# Patient Record
Sex: MALE | Race: BLACK OR AFRICAN AMERICAN | NOT HISPANIC OR LATINO | URBAN - METROPOLITAN AREA
[De-identification: names, ages, dates, MRNs, and addresses within clinical notes are randomized per-mention and may not be internally consistent; named-entity substitution may affect disease eponyms.]

---

## 2023-03-20 ENCOUNTER — OUT OF OFFICE VISIT (OUTPATIENT)
Dept: URBAN - METROPOLITAN AREA MEDICAL CENTER 16 | Facility: MEDICAL CENTER | Age: 78
End: 2023-03-20
Payer: COMMERCIAL

## 2023-03-20 DIAGNOSIS — Z12.11 AVERAGE RISK FOR CRC. DUE TO PT'S CO-MORBID STATE WITH END STAGE DEMENTIA, HIGH RISK FOR ANESTHESIA (PER NEUROLOGY); INABILITY TO TAKE A BOWEL PREP....WOULD NOT ADVISE ANY COLORECTAL CANCER SCREENING INCLUDING STOOL TEST FOR FECAL BLOOD.: ICD-10-CM

## 2023-03-20 DIAGNOSIS — D50.8 ACQUIRED IRON DEFICIENCY ANEMIA DUE TO DECREASED ABSORPTION: ICD-10-CM

## 2023-03-20 PROCEDURE — 996 AG2 NON-BILLABLE: Performed by: INTERNAL MEDICINE

## 2023-03-20 PROCEDURE — 99222 1ST HOSP IP/OBS MODERATE 55: CPT | Performed by: INTERNAL MEDICINE

## 2023-03-20 PROCEDURE — 99232 SBSQ HOSP IP/OBS MODERATE 35: CPT

## 2023-03-20 PROCEDURE — G8427 DOCREV CUR MEDS BY ELIG CLIN: HCPCS | Performed by: INTERNAL MEDICINE

## 2023-03-20 PROCEDURE — 996 AG2 NON-BILLABLE

## 2023-03-21 ENCOUNTER — OUT OF OFFICE VISIT (OUTPATIENT)
Dept: URBAN - METROPOLITAN AREA MEDICAL CENTER 16 | Facility: MEDICAL CENTER | Age: 78
End: 2023-03-21
Payer: COMMERCIAL

## 2023-03-21 DIAGNOSIS — D50.9 ANEMIA: ICD-10-CM

## 2023-03-21 DIAGNOSIS — K31.89 ACQUIRED DEFORMITY OF DUODENUM: ICD-10-CM

## 2023-03-21 DIAGNOSIS — C16.0 ADENOCARCINOMA, GASTRIC CARDIA: ICD-10-CM

## 2023-03-21 PROCEDURE — 45378 DIAGNOSTIC COLONOSCOPY: CPT | Performed by: INTERNAL MEDICINE

## 2023-03-21 PROCEDURE — 43239 EGD BIOPSY SINGLE/MULTIPLE: CPT | Performed by: INTERNAL MEDICINE

## 2023-03-23 ENCOUNTER — TELEPHONE ENCOUNTER (OUTPATIENT)
Dept: URBAN - METROPOLITAN AREA CLINIC 92 | Facility: CLINIC | Age: 78
End: 2023-03-23

## 2023-03-28 ENCOUNTER — TELEPHONE ENCOUNTER (OUTPATIENT)
Dept: URBAN - METROPOLITAN AREA CLINIC 35 | Facility: CLINIC | Age: 78
End: 2023-03-28

## 2023-03-28 ENCOUNTER — OFFICE VISIT (OUTPATIENT)
Dept: URBAN - METROPOLITAN AREA CLINIC 118 | Facility: CLINIC | Age: 78
End: 2023-03-28
Payer: COMMERCIAL

## 2023-03-28 ENCOUNTER — DASHBOARD ENCOUNTERS (OUTPATIENT)
Age: 78
End: 2023-03-28

## 2023-03-28 VITALS
HEART RATE: 75 BPM | DIASTOLIC BLOOD PRESSURE: 70 MMHG | TEMPERATURE: 97 F | WEIGHT: 238 LBS | SYSTOLIC BLOOD PRESSURE: 134 MMHG | BODY MASS INDEX: 38.25 KG/M2 | HEIGHT: 66 IN

## 2023-03-28 DIAGNOSIS — C16.9 GASTRIC ADENOCARCINOMA: ICD-10-CM

## 2023-03-28 DIAGNOSIS — D50.0 IRON DEFICIENCY ANEMIA DUE TO CHRONIC BLOOD LOSS: ICD-10-CM

## 2023-03-28 PROBLEM — 408647009: Status: ACTIVE | Noted: 2023-03-28

## 2023-03-28 PROCEDURE — 99214 OFFICE O/P EST MOD 30 MIN: CPT | Performed by: INTERNAL MEDICINE

## 2023-03-28 RX ORDER — ASPIRIN 81 MG/1
TAKE 1 TABLET (81 MG) BY ORAL ROUTE ONCE DAILY TABLET ORAL 1
Qty: 0 | Refills: 0 | Status: ACTIVE | COMMUNITY
Start: 1900-01-01

## 2023-03-28 RX ORDER — EZETIMIBE 10 MG/1
1 TABLET TABLET ORAL ONCE A DAY
Status: ACTIVE | COMMUNITY

## 2023-03-28 RX ORDER — TIZANIDINE 4 MG/1
TABLET ORAL
Qty: 0 | Refills: 0 | Status: ACTIVE | COMMUNITY
Start: 2017-05-01

## 2023-03-28 RX ORDER — PANTOPRAZOLE SODIUM 40 MG/1
1 TABLET TABLET, DELAYED RELEASE ORAL ONCE A DAY
Status: ACTIVE | COMMUNITY

## 2023-03-28 RX ORDER — GABAPENTIN 300 MG/1
CAPSULE ORAL
Qty: 0 | Refills: 0 | Status: ACTIVE | COMMUNITY
Start: 2017-05-01

## 2023-03-28 RX ORDER — HYDROCODONE BITARTRATE AND ACETAMINOPHEN 5; 325 MG/1; MG/1
TABLET ORAL
Qty: 0 | Refills: 0 | Status: ACTIVE | COMMUNITY
Start: 2017-05-01

## 2023-03-28 RX ORDER — CYANOCOBALAMIN 1000 UG/ML
1 ML INJECTION INTRAMUSCULAR; SUBCUTANEOUS
Status: ACTIVE | COMMUNITY

## 2023-03-28 RX ORDER — LOSARTAN POTASSIUM 25 MG/1
TABLET, FILM COATED ORAL
Qty: 0 | Refills: 0 | Status: ACTIVE | COMMUNITY
Start: 2017-02-19

## 2023-03-28 RX ORDER — TRAMADOL HYDROCHLORIDE 50 MG/1
TABLET ORAL
Qty: 0 | Refills: 0 | Status: ACTIVE | COMMUNITY
Start: 2016-12-30

## 2023-03-28 NOTE — HPI-TODAY'S VISIT:
This is a 78 yo male with pmh of prostate cancer, HTN, and DM here for a hospital follow up.   Patient was recently admitted at [facility] last week for symptomatic anemia. Hgb down to 5 range from previous baseline of 9 per outside records.  Patient had mild symptoms of dysphagia to mainly pills and low energy. He also had episodes of black stools.  Work up in the hospital with EGD and colonoscopy done. EGD showed an ulcerated mass at the GE junction extending into gastric cardia, a polypoid lesion in the duodenal bulb. Colonoscopy was poor prep but no large lesions found. Path showed Adenocarcinoma, moderately-poorly differentiated with focal signet ring cell change. He also had CT chest/abdomen/pelvis, which showed 10 mm hypodense focus in the right liver lobe.   Today, he is doing well and denies any black stools, abdominal pain, or nausea/vomiting. Feels better after receiving blood transfusion in the hospital. He was seen by Dr. Nixon, hem/onc during admission and has a follow up appt this Friday.   3/21/2023 EGD FINDINGS:  1.	There was a partly circumferential mass with ulceration extending from the distal esophagus to gastric cardia. Biopsies obtained.  2.	Mild atrophic mucosa in the gastric body and antrum. Biopsies obtained.  3.	Rest of the esophagus exam was normal.  4.	There was polypoid lesion, about 10 cm in size in the duodenal bulb. Biopsies obtained.  5.	Rest of the duodenum exam appeared normal.   Colonoscopy Findings:   1.	The colon prep was poor with remaining dark brown stools throughout the colon.  2.	No large lesions identified. No blood throughout the colon.  3.	Unable to advance into the terminal ileum.   Path A. Duodenal bulb (biopsy): - Gastric heterotopia; - No dysplasia or malignancy.   B. Stomach, antrum (biopsy): - Benign antral mucosa with reactive/regenerative change; - No H. pylori.   C. Stomach, body (biopsy): - Benign fundic mucosa with no significant pathologic change; - No H. pylori.   D. Submitted as" gastric cardia mass" (biopsy): - Adenocarcinoma, moderately-poorly differentiated with focal signet ring cell change.   CT chest/a/p IMPRESSION: Postop change prostatectomy. No bony lesions are seen.   Nonspecific 10 mm hypodense focus centrally in the right lobe of the liver. Etiology uncertain. Follow-up suggested   Bilateral gynecomastia. Satisfactory

## 2023-03-29 ENCOUNTER — TELEPHONE ENCOUNTER (OUTPATIENT)
Dept: URBAN - METROPOLITAN AREA CLINIC 35 | Facility: CLINIC | Age: 78
End: 2023-03-29

## 2023-03-29 PROBLEM — 724556004: Status: ACTIVE | Noted: 2023-03-29

## 2023-04-05 ENCOUNTER — OFFICE VISIT (OUTPATIENT)
Dept: URBAN - METROPOLITAN AREA MEDICAL CENTER 28 | Facility: MEDICAL CENTER | Age: 78
End: 2023-04-05
Payer: COMMERCIAL

## 2023-04-05 DIAGNOSIS — R59.0 ABDOMINAL LYMPHADENOPATHY: ICD-10-CM

## 2023-04-05 DIAGNOSIS — C16.9 ADENOCARCINOMA OF STOMACH: ICD-10-CM

## 2023-04-05 DIAGNOSIS — K31.89 ACQUIRED DEFORMITY OF DUODENUM: ICD-10-CM

## 2023-04-05 PROCEDURE — 43259 EGD US EXAM DUODENUM/JEJUNUM: CPT | Performed by: INTERNAL MEDICINE

## 2023-04-05 RX ORDER — EZETIMIBE 10 MG/1
1 TABLET TABLET ORAL ONCE A DAY
Status: ACTIVE | COMMUNITY

## 2023-04-05 RX ORDER — PANTOPRAZOLE SODIUM 40 MG/1
1 TABLET TABLET, DELAYED RELEASE ORAL ONCE A DAY
Status: ACTIVE | COMMUNITY

## 2023-04-05 RX ORDER — HYDROCODONE BITARTRATE AND ACETAMINOPHEN 5; 325 MG/1; MG/1
TABLET ORAL
Qty: 0 | Refills: 0 | Status: ACTIVE | COMMUNITY
Start: 2017-05-01

## 2023-04-05 RX ORDER — TRAMADOL HYDROCHLORIDE 50 MG/1
TABLET ORAL
Qty: 0 | Refills: 0 | Status: ACTIVE | COMMUNITY
Start: 2016-12-30

## 2023-04-05 RX ORDER — TIZANIDINE 4 MG/1
TABLET ORAL
Qty: 0 | Refills: 0 | Status: ACTIVE | COMMUNITY
Start: 2017-05-01

## 2023-04-05 RX ORDER — LOSARTAN POTASSIUM 25 MG/1
TABLET, FILM COATED ORAL
Qty: 0 | Refills: 0 | Status: ACTIVE | COMMUNITY
Start: 2017-02-19

## 2023-04-05 RX ORDER — GABAPENTIN 300 MG/1
CAPSULE ORAL
Qty: 0 | Refills: 0 | Status: ACTIVE | COMMUNITY
Start: 2017-05-01

## 2023-04-05 RX ORDER — ASPIRIN 81 MG/1
TAKE 1 TABLET (81 MG) BY ORAL ROUTE ONCE DAILY TABLET ORAL 1
Qty: 0 | Refills: 0 | Status: ACTIVE | COMMUNITY
Start: 1900-01-01

## 2023-04-05 RX ORDER — CYANOCOBALAMIN 1000 UG/ML
1 ML INJECTION INTRAMUSCULAR; SUBCUTANEOUS
Status: ACTIVE | COMMUNITY

## 2023-05-25 ENCOUNTER — OFFICE VISIT (OUTPATIENT)
Dept: URBAN - METROPOLITAN AREA CLINIC 118 | Facility: CLINIC | Age: 78
End: 2023-05-25

## 2023-06-15 ENCOUNTER — TELEPHONE ENCOUNTER (OUTPATIENT)
Dept: URBAN - METROPOLITAN AREA CLINIC 118 | Facility: CLINIC | Age: 78
End: 2023-06-15